# Patient Record
Sex: FEMALE | Race: WHITE | NOT HISPANIC OR LATINO | URBAN - NONMETROPOLITAN AREA
[De-identification: names, ages, dates, MRNs, and addresses within clinical notes are randomized per-mention and may not be internally consistent; named-entity substitution may affect disease eponyms.]

---
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
## 2020-08-10 ENCOUNTER — IMPORTED ENCOUNTER (OUTPATIENT)
Dept: URBAN - NONMETROPOLITAN AREA CLINIC 1 | Facility: CLINIC | Age: 20
End: 2020-08-10

## 2020-08-10 PROCEDURE — 92015 DETERMINE REFRACTIVE STATE: CPT

## 2020-08-10 PROCEDURE — 92014 COMPRE OPH EXAM EST PT 1/>: CPT

## 2020-08-10 PROCEDURE — 92310 CONTACT LENS FITTING OU: CPT

## 2020-10-19 ENCOUNTER — IMPORTED ENCOUNTER (OUTPATIENT)
Dept: URBAN - NONMETROPOLITAN AREA CLINIC 1 | Facility: CLINIC | Age: 20
End: 2020-10-19

## 2020-10-19 PROBLEM — T15.02XA: Noted: 2020-10-19

## 2020-10-19 PROCEDURE — 65222 REMOVE FOREIGN BODY FROM EYE: CPT

## 2020-10-19 NOTE — PATIENT DISCUSSION
Corneal FB OSDiscussed findings in detail with patient. Removed at slitlamp without complications after patient's verbal consent. D/C CL X 1 week. Start Tobradex ST TID OS use until sample is empty. Continue to monitor.

## 2020-11-13 ENCOUNTER — IMPORTED ENCOUNTER (OUTPATIENT)
Dept: URBAN - NONMETROPOLITAN AREA CLINIC 1 | Facility: CLINIC | Age: 20
End: 2020-11-13

## 2020-11-13 PROBLEM — H10.412: Noted: 2020-11-13

## 2020-11-13 PROCEDURE — 99213 OFFICE O/P EST LOW 20 MIN: CPT

## 2020-11-13 NOTE — PATIENT DISCUSSION
GPC OSDiscussed findings in detail with patient. Signs/symptoms discussed with patient. D/C CL X 2 weeks. Start Lotemax SM OS X 2 weeks Rx sent to pharmacy. Recommend switching to dailies in the future. Continue to monitor. RTC in 2 weeks for follow up

## 2020-11-30 ENCOUNTER — IMPORTED ENCOUNTER (OUTPATIENT)
Dept: URBAN - NONMETROPOLITAN AREA CLINIC 1 | Facility: CLINIC | Age: 20
End: 2020-11-30

## 2020-11-30 PROCEDURE — 99213 OFFICE O/P EST LOW 20 MIN: CPT

## 2020-11-30 NOTE — PATIENT DISCUSSION
GPC OSDiscussed findings in detail with patient. Signs/symptoms discussed with patient. D/C Lotemax SMStart Prednisolone Acetate QID OS X 1 week then BID X 1 week. Recommend switching to dailies in the future. Continue to monitor. RTC in 2 weeks for follow up

## 2021-08-13 ENCOUNTER — IMPORTED ENCOUNTER (OUTPATIENT)
Dept: URBAN - NONMETROPOLITAN AREA CLINIC 1 | Facility: CLINIC | Age: 21
End: 2021-08-13

## 2021-08-13 PROBLEM — H52.222: Noted: 2021-08-13

## 2021-08-13 PROBLEM — H52.13: Noted: 2021-08-13

## 2021-08-13 PROBLEM — H52.12: Noted: 2021-08-13

## 2021-08-13 PROBLEM — H52.223: Noted: 2021-08-13

## 2021-08-13 PROCEDURE — 92310 CONTACT LENS FITTING OU: CPT

## 2021-08-13 PROCEDURE — 92014 COMPRE OPH EXAM EST PT 1/>: CPT

## 2021-08-13 PROCEDURE — 92015 DETERMINE REFRACTIVE STATE: CPT

## 2021-08-13 NOTE — PATIENT DISCUSSION
Myopia/AstigmatismDiscussed refractive status w/ pt todayMR done new GLRx and CLRx given todaySwitched to dailiy lens due to hx of recurrent GPCMonitor yearly or PRNHx of GPC OSDiscussed findings in detail with patient. Signs/symptoms discussed with patient. No abnormal findings today. Recommended Pataday OU QAM (before putting in CL daily). Recommend switching to daily lenses today patient agreed. Continue to monitor for recurrence PRN.

## 2022-04-15 ASSESSMENT — KERATOMETRY
OD_K1POWER_DIOPTERS: 40.50
OS_AXISANGLE_DEGREES: 079
OS_K1POWER_DIOPTERS: 40.25
OS_K2POWER_DIOPTERS: 41.50
OD_K2POWER_DIOPTERS: 41.50
OD_AXISANGLE_DEGREES: 087

## 2022-04-15 ASSESSMENT — VISUAL ACUITY
OS_CC: 20/200
OS_CC: 20/20 W/ CL
OD_CC: 20/20
OS_PH: 20/25
OS_CC: J1+
OD_CC: 20/25
OD_CC: J1
OD_SC: 20/20
OD_CC: 20/20
OD_SC: 20/20
OS_SC: J1+
OS_PH: 20/40
OD_SC: J1
OD_SC: 20/20
OS_SC: 20/20
OS_CC: 20/200
OS_SC: 20/20
OS_SC: 20/20

## 2022-04-15 ASSESSMENT — TONOMETRY
OS_IOP_MMHG: 16
OS_IOP_MMHG: 18
OD_IOP_MMHG: 18
OD_IOP_MMHG: 18
OS_IOP_MMHG: 18
OS_IOP_MMHG: 18
OD_IOP_MMHG: 16
OD_IOP_MMHG: 17
OS_IOP_MMHG: 19
OD_IOP_MMHG: 19

## 2022-08-15 ENCOUNTER — COMPREHENSIVE EXAM (OUTPATIENT)
Dept: URBAN - NONMETROPOLITAN AREA CLINIC 1 | Facility: CLINIC | Age: 22
End: 2022-08-15

## 2022-08-15 DIAGNOSIS — H52.13: ICD-10-CM

## 2022-08-15 PROCEDURE — 92015 DETERMINE REFRACTIVE STATE: CPT

## 2022-08-15 PROCEDURE — 92014 COMPRE OPH EXAM EST PT 1/>: CPT

## 2022-08-15 ASSESSMENT — TONOMETRY
OD_IOP_MMHG: 18
OS_IOP_MMHG: 18

## 2022-08-15 ASSESSMENT — VISUAL ACUITY
OU_CC: 20/20
OD_CC: 20/20
OS_CC: 20/25-1

## 2024-04-12 ENCOUNTER — COMPREHENSIVE EXAM (OUTPATIENT)
Dept: URBAN - NONMETROPOLITAN AREA CLINIC 1 | Facility: CLINIC | Age: 24
End: 2024-04-12

## 2024-04-12 DIAGNOSIS — H52.222: ICD-10-CM

## 2024-04-12 DIAGNOSIS — H52.12: ICD-10-CM

## 2024-04-12 PROCEDURE — 92310-E CONTACT LENS FITTING ESTABLISH PATIENT

## 2024-04-12 PROCEDURE — 92015 DETERMINE REFRACTIVE STATE: CPT

## 2024-04-12 PROCEDURE — 92014 COMPRE OPH EXAM EST PT 1/>: CPT

## 2024-04-12 ASSESSMENT — KERATOMETRY
OD_K1POWER_DIOPTERS: 40.50
OD_AXISANGLE_DEGREES: 004
OD_AXISANGLE2_DEGREES: 94
OS_K1POWER_DIOPTERS: 40.50
OS_AXISANGLE_DEGREES: 171
OD_K2POWER_DIOPTERS: 41.50
OS_K2POWER_DIOPTERS: 41.50
OS_AXISANGLE2_DEGREES: 81

## 2024-04-12 ASSESSMENT — VISUAL ACUITY
OD_CC: 20/22
OS_CC: 20/20
OU_CC: 20/20

## 2024-04-12 ASSESSMENT — TONOMETRY
OD_IOP_MMHG: 16
OS_IOP_MMHG: 16

## 2025-05-08 ENCOUNTER — COMPREHENSIVE EXAM (OUTPATIENT)
Age: 25
End: 2025-05-08

## 2025-05-08 DIAGNOSIS — H52.222: ICD-10-CM

## 2025-05-08 DIAGNOSIS — H52.12: ICD-10-CM

## 2025-05-08 PROCEDURE — 92015 DETERMINE REFRACTIVE STATE: CPT

## 2025-05-08 PROCEDURE — 92014 COMPRE OPH EXAM EST PT 1/>: CPT
